# Patient Record
Sex: FEMALE | Race: WHITE | NOT HISPANIC OR LATINO | Employment: FULL TIME | URBAN - METROPOLITAN AREA
[De-identification: names, ages, dates, MRNs, and addresses within clinical notes are randomized per-mention and may not be internally consistent; named-entity substitution may affect disease eponyms.]

---

## 2019-08-19 ENCOUNTER — TELEPHONE (OUTPATIENT)
Dept: URGENT CARE | Facility: CLINIC | Age: 49
End: 2019-08-19

## 2019-08-19 NOTE — TELEPHONE ENCOUNTER
[facility] has attempted to call patient 3 times to schedule recommended follow-up appointment. Referral sent back to [facility]. Attempted to call patient to set up appointment, No answer. Letter sent to patient instructing her to call [facility] to schedule;e needed appointment.

## 2019-11-12 ENCOUNTER — HOSPITAL ENCOUNTER (OUTPATIENT)
Dept: GENERAL RADIOLOGY | Facility: HOSPITAL | Age: 49
Discharge: HOME | End: 2019-11-12
Admitting: STUDENT IN AN ORGANIZED HEALTH CARE EDUCATION/TRAINING PROGRAM

## 2019-11-12 ENCOUNTER — TRANSCRIBE ORDERS (OUTPATIENT)
Dept: ADMINISTRATIVE | Facility: HOSPITAL | Age: 49
End: 2019-11-12

## 2019-11-12 DIAGNOSIS — J20.9 ACUTE BRONCHITIS, UNSPECIFIED ORGANISM: Primary | ICD-10-CM

## 2019-11-12 PROCEDURE — 71046 X-RAY EXAM CHEST 2 VIEWS: CPT

## 2020-07-28 ENCOUNTER — TRANSCRIBE ORDERS (OUTPATIENT)
Dept: ADMINISTRATIVE | Facility: HOSPITAL | Age: 50
End: 2020-07-28

## 2020-07-28 DIAGNOSIS — Z12.31 VISIT FOR SCREENING MAMMOGRAM: Primary | ICD-10-CM

## 2020-08-03 ENCOUNTER — APPOINTMENT (OUTPATIENT)
Dept: PREADMISSION TESTING | Facility: HOSPITAL | Age: 50
End: 2020-08-03

## 2020-08-03 PROCEDURE — C9803 HOPD COVID-19 SPEC COLLECT: HCPCS

## 2020-08-03 PROCEDURE — U0004 COV-19 TEST NON-CDC HGH THRU: HCPCS

## 2020-08-03 PROCEDURE — U0002 COVID-19 LAB TEST NON-CDC: HCPCS

## 2020-08-04 LAB
REF LAB TEST METHOD: NORMAL
SARS-COV-2 RNA RESP QL NAA+PROBE: NOT DETECTED

## 2022-06-15 ENCOUNTER — TRANSCRIBE ORDERS (OUTPATIENT)
Dept: ADMINISTRATIVE | Facility: HOSPITAL | Age: 52
End: 2022-06-15

## 2022-06-15 DIAGNOSIS — R10.10 PAIN OF UPPER ABDOMEN: ICD-10-CM

## 2022-06-15 DIAGNOSIS — Z11.59 ENCOUNTER FOR SPECIAL SCREENING EXAMINATION FOR VIRAL DISEASE: Primary | ICD-10-CM

## 2022-06-24 ENCOUNTER — APPOINTMENT (OUTPATIENT)
Dept: PREADMISSION TESTING | Facility: HOSPITAL | Age: 52
End: 2022-06-24

## 2022-06-27 ENCOUNTER — APPOINTMENT (OUTPATIENT)
Dept: GENERAL RADIOLOGY | Facility: HOSPITAL | Age: 52
End: 2022-06-27

## 2022-07-01 ENCOUNTER — APPOINTMENT (OUTPATIENT)
Dept: ULTRASOUND IMAGING | Facility: HOSPITAL | Age: 52
End: 2022-07-01

## 2023-03-21 ENCOUNTER — TELEPHONE (OUTPATIENT)
Dept: URGENT CARE | Facility: CLINIC | Age: 53
End: 2023-03-21
Payer: COMMERCIAL

## 2023-05-11 ENCOUNTER — OFFICE VISIT (OUTPATIENT)
Dept: ORTHOPEDIC SURGERY | Facility: CLINIC | Age: 53
End: 2023-05-11
Payer: COMMERCIAL

## 2023-05-11 VITALS
SYSTOLIC BLOOD PRESSURE: 158 MMHG | HEIGHT: 65 IN | WEIGHT: 168 LBS | BODY MASS INDEX: 27.99 KG/M2 | DIASTOLIC BLOOD PRESSURE: 88 MMHG

## 2023-05-11 DIAGNOSIS — M76.61 RIGHT ACHILLES TENDINITIS: Primary | ICD-10-CM

## 2023-05-11 DIAGNOSIS — M72.2 PLANTAR FASCIITIS OF RIGHT FOOT: ICD-10-CM

## 2023-05-11 RX ORDER — CELECOXIB 200 MG/1
CAPSULE ORAL
Qty: 60 CAPSULE | Refills: 1 | Status: SHIPPED | OUTPATIENT
Start: 2023-05-11

## 2023-05-11 NOTE — PROGRESS NOTES
[facility] [place]     Office Visit       Date: 05/11/2023   Patient Name: Meseret Rizzo  MRN: [id number]  YOB: 1970    Referring Physician: Dejah Motley*     Chief Complaint:   Chief Complaint   Patient presents with   • Right Foot - Pain     History of Present Illness:   Meseret Rizzo is a 52 y.o. female who presents with new problem of: right foot pain.  Onset: atraumatic and gradual in nature. The issue has been ongoing for 6 month(s). Pain is a 7/10 on the pain scale. Pain is described as aching, burning and throbbing. Associated symptoms include pain, swelling and popping. The pain is worse with walking, standing, sleeping and working; resting, ice and pain medication and/or NSAID improve the pain. Previous treatments have included: NSAIDS and oral steroids.    Subjective   Review of Systems: Review of Systems   Constitutional: Negative for chills, fever, unexpected weight gain and unexpected weight loss.   HENT: Negative for congestion, postnasal drip and rhinorrhea.    Eyes: Negative for blurred vision.   Respiratory: Negative for shortness of breath.    Cardiovascular: Negative for leg swelling.   Gastrointestinal: Negative for abdominal pain, nausea and vomiting.   Genitourinary: Negative for difficulty urinating.   Musculoskeletal: Positive for arthralgias. Negative for gait problem, joint swelling and myalgias.   Skin: Negative for skin lesions and wound.   Neurological: Negative for dizziness, weakness, light-headedness and numbness.   Hematological: Does not bruise/bleed easily.   Psychiatric/Behavioral: Negative for depressed mood.        I have reviewed the following portions of the patient's history:History of Present Illness and review of systems.    Past Medical History:   Past Medical History:   Diagnosis Date   • CTS (carpal tunnel syndrome) 2012   • Dislocated elbow 2012   • Dislocation of  "finger 2018   • Fracture, finger 2019   • History of stomach ulcers    • Knee sprain 1979   • Knee swelling 1994   • Low back strain 2019   • Tear of meniscus of knee 2002   • Tendinitis of knee    • Tennis elbow 2012   • Wrist sprain 2012       Past Surgical History:   Past Surgical History:   Procedure Laterality Date   • HYSTERECTOMY     • KNEE SURGERY     • TEMPOROMANDIBULAR JOINT SURGERY         Family History: History reviewed. No pertinent family history.    Social History:   Social History     Socioeconomic History   • Marital status: Single   Tobacco Use   • Smoking status: Passive Smoke Exposure - Never Smoker   • Smokeless tobacco: Never   Vaping Use   • Vaping Use: Never used   Substance and Sexual Activity   • Alcohol use: Yes     Alcohol/week: 7.0 standard drinks     Types: 4 Glasses of wine, 3 Drinks containing 0.5 oz of alcohol per week   • Drug use: Never   • Sexual activity: Yes     Partners: Male     Birth control/protection: Hysterectomy       Medications:   Current Outpatient Medications:   •  fluticasone (FLONASE) 50 MCG/ACT nasal spray, Flonase Allergy Relief 50 mcg/actuation nasal spray,suspension  Daily, Disp: , Rfl:   •  pantoprazole (PROTONIX) 40 MG EC tablet, Take 1 tablet by mouth Daily., Disp: , Rfl:   •  celecoxib (CeleBREX) 200 MG capsule, Take 1 tablet orally 2 times per day with meals., Disp: 60 capsule, Rfl: 1    Allergies:   Allergies   Allergen Reactions   • Codeine Hives       I reviewed the patient's chief complaint, history of present illness, review of systems, past medical history, surgical history, family history, social history, medications and allergy list.     Objective    Vital Signs:   Vitals:    05/11/23 1506   BP: 158/88   Weight: 76.2 kg (168 lb)   Height: 165.1 cm (65\")     Body mass index is 27.96 kg/m².   BMI is >= 25 and <30. (Overweight) The following options were offered after discussion;: exercise counseling/recommendations and nutrition " counseling/recommendations     Patient reports that she is a non-smoker and has not ever been a smoker.  This behavior was applauded and she was encouraged to continue in smoking cessation.  We will continue to monitor at subsequent visits.    Ortho Exam:  Constitutional: General Appearance: healthy-appearing, NAD, normal body habitus, and overweight.   Psychiatric: Orientation: oriented to time, place, and person. Mood and Affect: normal mood and affect and active and alert.   Cardiovascular System: Arterial Pulses Right: dorsalis pedis normal. Arterial Pulses Left: dorsalis pedis normal. Varicosities Right: capillary refill test normal. Varicosities Left: capillary refill test normal.   Gait and Station: Appearance: ambulating with no assistive devices and antalgic gait.   Ankles and Feet: Inspection Right: no erythema, induration, warmth, or deformity and normal alignment and swelling. Bony Palpation of the Ankle/Foot Right: no tenderness of the ankle and tenderness of the achilles tendon insertion. Soft Tissue Palpation of the Ankle/Foot Right: tenderness of the achilles tendon. Active Range of Motion Right: dorsiflexion (10 deg.).  Tenderness of the medial calcaneal tubercle.  Stability Right: anterior drawer negative and talar tilt negative. Strength Right: extensor digitorum longus (5/5) and brevis (5/5); extensor hallucis longus (5/5); peroneus longus (5/5) and brevis (5/5); and posterior tibialis (5/5), tibialis anterior (5/5), and gastrocnemius (5/5).   Neurological System: Sensation on the Right: normal distal extremities. Sensation on the Left: normal distal extremities.   Skin: Right Lower Extremity: normal. Left Lower Extremity: normal.    Results Review:   Imaging Results (Last 24 Hours)     ** No results found for the last 24 hours. **      XR Calcaneus 2+ View Right (04/27/2023 17:19)  I personally reviewed the radiographs of the right toes and calcaneus.  No acute fracture or dislocation.  There is  an enthesophyte at the plantar aspect of the calcaneus.  No posterior enthesophyte.    Procedures    Assessment / Plan    Assessment/Plan:   Diagnoses and all orders for this visit:    1. Right Achilles tendinitis (Primary)  -     Ambulatory Referral to Physical Therapy Evaluate and treat, Ortho; Electrotherapy; E-stim, Iontophoresis; Soft Tissue Mobilizaton; Stretching, Strengthening, ROM; Full weight bearing  -     celecoxib (CeleBREX) 200 MG capsule; Take 1 tablet orally 2 times per day with meals.  Dispense: 60 capsule; Refill: 1    2. Plantar fasciitis of right foot      6 months of worsening right heel pain.  She was working under a presumed diagnosis of plantar fasciitis.  She has been icing, stretching, and offloading her foot in a walking boot.  However, she has developed more heel pain around her Achilles.  Radiographs of the right foot do show an enthesophyte at the plantar aspect of the calcaneus but not at the Achilles.  On exam, she is most tender through the mid substance and insertion of the Achilles onto the calcaneus.  She is also tender at the medial calcaneal tubercle but to a lesser extent.  I discussed both diagnoses with her.  I recommend offloading the foot to help with pain.  We will start her on Celebrex today to help with those symptoms as well.  I will get her into physical therapy to help with additional treatments especially around the Achilles.  I believe we can get the Achilles to calm down that we can appropriately treat the plantar fascia.  I will see her back in 4 weeks to monitor response and decide on additional treatment options.    Previous documentation reviewed: 4/27/2023-urgent care-Dejah Parry MD.    Previous imaging studies reviewed: 4/27/2023-radiographs of the right calcaneus.  4/27/2023-radiographs of the right toe    Follow Up:   Return in about 4 weeks (around 6/8/2023) for Recheck.      Andres Alcala MD  [facility]

## 2023-06-07 ENCOUNTER — OFFICE VISIT (OUTPATIENT)
Dept: ORTHOPEDIC SURGERY | Facility: CLINIC | Age: 53
End: 2023-06-07
Payer: COMMERCIAL

## 2023-06-07 VITALS
WEIGHT: 167.99 LBS | HEIGHT: 65 IN | BODY MASS INDEX: 27.99 KG/M2 | DIASTOLIC BLOOD PRESSURE: 84 MMHG | SYSTOLIC BLOOD PRESSURE: 138 MMHG

## 2023-06-07 DIAGNOSIS — M72.2 PLANTAR FASCIITIS OF RIGHT FOOT: ICD-10-CM

## 2023-06-07 DIAGNOSIS — M76.61 RIGHT ACHILLES TENDINITIS: Primary | ICD-10-CM

## 2023-06-07 NOTE — PROGRESS NOTES
[facility] [place] Up Visit     Office Follow Up      Date: 06/07/2023   Patient Name: Meseret Rizzo  MRN: [id number]  YOB: 1970    Referring Physician: No ref. provider found     Chief Complaint:   Chief Complaint   Patient presents with    Follow-up     4 weeks- Right Achilles tendinitis       History of Present Illness: Meseret Rizzo is a 52 y.o. female who is here today for follow up on right foot pain from Achilles tendinitis and plantar fasciitis.  Since last visit, she has been offloading in a walking boot, taking anti-inflammatory medication, and doing home exercises especially stretches.  Pain is greatly improved and down to a 1/10.  She has had no further injury.  Overall, she states that she feels better.    Subjective   Review of Systems: Review of Systems   Constitutional:  Negative for chills, fever, unexpected weight gain and unexpected weight loss.   HENT:  Negative for congestion, postnasal drip and rhinorrhea.    Eyes:  Negative for blurred vision.   Respiratory:  Negative for shortness of breath.    Cardiovascular:  Negative for leg swelling.   Gastrointestinal:  Negative for abdominal pain, nausea and vomiting.   Genitourinary:  Negative for difficulty urinating.   Musculoskeletal:  Positive for arthralgias. Negative for gait problem, joint swelling and myalgias.   Skin:  Negative for skin lesions and wound.   Neurological:  Negative for dizziness, weakness, light-headedness and numbness.   Hematological:  Does not bruise/bleed easily.   Psychiatric/Behavioral:  Negative for depressed mood.       Medications:   Current Outpatient Medications:     celecoxib (CeleBREX) 200 MG capsule, Take 1 tablet orally 2 times per day with meals., Disp: 60 capsule, Rfl: 1    fluticasone (FLONASE) 50 MCG/ACT nasal spray, Flonase Allergy Relief 50 mcg/actuation nasal spray,suspension  Daily, Disp: , Rfl:     pantoprazole  "(PROTONIX) 40 MG EC tablet, Take 1 tablet by mouth Daily., Disp: , Rfl:     Allergies:   Allergies   Allergen Reactions    Codeine Hives       I have reviewed and updated the patient's chief complaint, history of present illness, review of systems, past medical history, surgical history, family history, social history, medications and allergy list as appropriate.     Objective    Vital Signs:   Vitals:    06/07/23 1540   BP: 138/84   Weight: 76.2 kg (167 lb 15.9 oz)   Height: 165.1 cm (65\")     Body mass index is 27.96 kg/m². BMI is >= 25 and <30. (Overweight) The following options were offered after discussion;: exercise counseling/recommendations and nutrition counseling/recommendations     Patient reports that she is a non-smoker and has not ever been a smoker.  This behavior was applauded and she was encouraged to continue in smoking cessation.  We will continue to monitor at subsequent visits.     Ortho Exam:  Constitutional: General Appearance: healthy-appearing, NAD, normal body habitus, and overweight.   Psychiatric: Orientation: oriented to time, place, and person. Mood and Affect: normal mood and affect and active and alert.   Cardiovascular System: Arterial Pulses Right: dorsalis pedis normal. Arterial Pulses Left: dorsalis pedis normal. Varicosities Right: capillary refill test normal. Varicosities Left: capillary refill test normal.   Gait and Station: Appearance: ambulating with no assistive devices and antalgic gait.   Ankles and Feet: Inspection Right: no erythema, induration, warmth, or deformity and normal alignment and no swelling. Bony Palpation of the Ankle/Foot Right: no tenderness of the ankle and no tenderness of the achilles tendon insertion. Soft Tissue Palpation of the Ankle/Foot Right: no tenderness of the achilles tendon. Active Range of Motion Right: dorsiflexion (15 deg.).  Tenderness of the medial calcaneal tubercle.  Stability Right: anterior drawer negative and talar tilt negative. " Strength Right: extensor digitorum longus (5/5) and brevis (5/5); extensor hallucis longus (5/5); peroneus longus (5/5) and brevis (5/5); and posterior tibialis (5/5), tibialis anterior (5/5), and gastrocnemius (5/5).   Neurological System: Sensation on the Right: normal distal extremities. Sensation on the Left: normal distal extremities.   Skin: Right Lower Extremity: normal. Left Lower Extremity: normal.    Results Review:   Imaging Results (Last 24 Hours)       ** No results found for the last 24 hours. **            Procedures    Assessment / Plan    Assessment/Plan:   Diagnoses and all orders for this visit:    1. Right Achilles tendinitis (Primary)    2. Plantar fasciitis of right foot      Follow-up on right foot pain from plantar fasciitis and Achilles tendinitis.  Symptoms greatly improved with offloading into walking boot.  She is also been taking Celebrex.  She is been will transition out of the walking boot into a regular shoe.  She is walking without a limp or any significant increase in her symptoms.  She is also been doing home exercise stretching program as outlined at last visit.  She is doing this 4-5 times per week.  I counseled her on continued use of home exercise program.  She should take the Celebrex on an as-needed basis.  If symptoms flare, she can go into the walking boot for 2 to 3 days at a time but not for prolonged periods of time.  If this does not help over the course of 1 week, she should follow back for additional treatment options.  I am happy to see her back at any point but follow-up moving forward will be on an as-needed basis.    Follow Up:   Return if symptoms worsen or fail to improve.      Andres Alcala MD  [facility]

## 2023-08-08 DIAGNOSIS — M76.61 RIGHT ACHILLES TENDINITIS: ICD-10-CM

## 2023-08-08 RX ORDER — CELECOXIB 200 MG/1
CAPSULE ORAL
Qty: 60 CAPSULE | Refills: 1 | Status: SHIPPED | OUTPATIENT
Start: 2023-08-08

## 2023-11-17 ENCOUNTER — HOSPITAL ENCOUNTER (OUTPATIENT)
Dept: GENERAL RADIOLOGY | Facility: HOSPITAL | Age: 53
Discharge: HOME | End: 2023-11-17
Admitting: PHYSICIAN ASSISTANT
Payer: COMMERCIAL

## 2023-11-17 ENCOUNTER — TRANSCRIBE ORDERS (OUTPATIENT)
Dept: GENERAL RADIOLOGY | Facility: HOSPITAL | Age: 53
End: 2023-11-17
Payer: COMMERCIAL

## 2023-11-17 DIAGNOSIS — S46.811S TRAPEZIUS MUSCLE STRAIN, RIGHT, SEQUELA: Primary | ICD-10-CM

## 2023-11-17 DIAGNOSIS — S46.811S TRAPEZIUS MUSCLE STRAIN, RIGHT, SEQUELA: ICD-10-CM

## 2023-11-17 PROCEDURE — 72040 X-RAY EXAM NECK SPINE 2-3 VW: CPT

## 2024-10-08 ENCOUNTER — PATIENT ROUNDING (BHMG ONLY) (OUTPATIENT)
Dept: URGENT CARE | Facility: CLINIC | Age: 54
End: 2024-10-08
Payer: COMMERCIAL

## 2024-10-08 ENCOUNTER — TELEPHONE (OUTPATIENT)
Dept: URGENT CARE | Facility: CLINIC | Age: 54
End: 2024-10-08
Payer: COMMERCIAL

## 2024-10-08 NOTE — TELEPHONE ENCOUNTER
Pt contacted the office stating that she was in here yesterday and was diagnosed with bronchitis. She stated that she has always gotten an antibiotic with it but she did not get one this time and it wanting to know if one could be called in for her?

## 2024-10-11 NOTE — TELEPHONE ENCOUNTER
I followed back up with patient and she stated that she is feeling better than she was.I went over the instructions that the provider had left.She verbalized understanding. I explained that if she gets to feeling worse to please come back in for retesting.

## 2024-10-26 PROCEDURE — 87086 URINE CULTURE/COLONY COUNT: CPT | Performed by: NURSE PRACTITIONER

## 2024-10-29 ENCOUNTER — TELEPHONE (OUTPATIENT)
Dept: URGENT CARE | Facility: CLINIC | Age: 54
End: 2024-10-29
Payer: COMMERCIAL

## 2024-10-30 NOTE — TELEPHONE ENCOUNTER
Elizabeth Tinajero MA  10/29/2024  5:42 PM EDT       Patient informed. Dems verified. Stated understanding      Documented on pt urine culture result

## 2025-03-31 ENCOUNTER — TRANSCRIBE ORDERS (OUTPATIENT)
Dept: GENERAL RADIOLOGY | Facility: HOSPITAL | Age: 55
End: 2025-03-31
Payer: COMMERCIAL

## 2025-03-31 ENCOUNTER — HOSPITAL ENCOUNTER (OUTPATIENT)
Dept: GENERAL RADIOLOGY | Facility: HOSPITAL | Age: 55
Discharge: HOME | End: 2025-03-31
Admitting: FAMILY MEDICINE
Payer: COMMERCIAL

## 2025-03-31 DIAGNOSIS — M54.9 BACK PAIN, UNSPECIFIED BACK LOCATION, UNSPECIFIED BACK PAIN LATERALITY, UNSPECIFIED CHRONICITY: ICD-10-CM

## 2025-03-31 DIAGNOSIS — M54.9 BACK PAIN, UNSPECIFIED BACK LOCATION, UNSPECIFIED BACK PAIN LATERALITY, UNSPECIFIED CHRONICITY: Primary | ICD-10-CM

## 2025-03-31 PROCEDURE — 72110 X-RAY EXAM L-2 SPINE 4/>VWS: CPT

## 2025-06-03 ENCOUNTER — TRANSCRIBE ORDERS (OUTPATIENT)
Dept: ADMINISTRATIVE | Facility: HOSPITAL | Age: 55
End: 2025-06-03
Payer: COMMERCIAL

## 2025-06-03 DIAGNOSIS — Z87.891 PERSONAL HISTORY OF SMOKING: Primary | ICD-10-CM

## 2025-06-11 ENCOUNTER — HOSPITAL ENCOUNTER (OUTPATIENT)
Dept: CT IMAGING | Facility: HOSPITAL | Age: 55
Discharge: HOME | End: 2025-06-11
Admitting: FAMILY MEDICINE
Payer: COMMERCIAL

## 2025-06-11 DIAGNOSIS — Z87.891 PERSONAL HISTORY OF SMOKING: ICD-10-CM

## 2025-06-11 PROCEDURE — 71271 CT THORAX LUNG CANCER SCR C-: CPT

## 2025-06-26 ENCOUNTER — TRANSCRIBE ORDERS (OUTPATIENT)
Dept: ADMINISTRATIVE | Facility: HOSPITAL | Age: 55
End: 2025-06-26
Payer: COMMERCIAL

## 2025-06-26 DIAGNOSIS — E04.1 THYROID NODULE: Primary | ICD-10-CM

## 2025-07-14 ENCOUNTER — HOSPITAL ENCOUNTER (OUTPATIENT)
Dept: ULTRASOUND IMAGING | Facility: HOSPITAL | Age: 55
Discharge: HOME | End: 2025-07-14
Admitting: FAMILY MEDICINE
Payer: COMMERCIAL

## 2025-07-14 DIAGNOSIS — E04.1 THYROID NODULE: ICD-10-CM

## 2025-07-14 PROCEDURE — 76536 US EXAM OF HEAD AND NECK: CPT

## 2025-07-23 ENCOUNTER — TRANSCRIBE ORDERS (OUTPATIENT)
Dept: ADMINISTRATIVE | Facility: HOSPITAL | Age: 55
End: 2025-07-23
Payer: COMMERCIAL

## 2025-07-23 DIAGNOSIS — E04.1 THYROID NODULE: Primary | ICD-10-CM

## 2025-08-20 ENCOUNTER — HOSPITAL ENCOUNTER (OUTPATIENT)
Dept: ULTRASOUND IMAGING | Facility: HOSPITAL | Age: 55
Discharge: HOME | End: 2025-08-20
Admitting: FAMILY MEDICINE
Payer: COMMERCIAL

## 2025-08-20 DIAGNOSIS — E04.1 THYROID NODULE: ICD-10-CM

## 2025-08-20 PROCEDURE — 25010000002 LIDOCAINE PF 1% 1 % SOLUTION: Performed by: STUDENT IN AN ORGANIZED HEALTH CARE EDUCATION/TRAINING PROGRAM

## 2025-08-20 RX ORDER — LIDOCAINE HYDROCHLORIDE 10 MG/ML
5 INJECTION, SOLUTION EPIDURAL; INFILTRATION; INTRACAUDAL; PERINEURAL ONCE
Status: COMPLETED | OUTPATIENT
Start: 2025-08-20 | End: 2025-08-20

## 2025-08-20 RX ADMIN — LIDOCAINE HYDROCHLORIDE 5 ML: 10 INJECTION, SOLUTION EPIDURAL; INFILTRATION; INTRACAUDAL; PERINEURAL at 13:40

## 2025-08-22 LAB — REF LAB TEST METHOD: NORMAL
